# Patient Record
Sex: FEMALE | Race: WHITE | Employment: FULL TIME | URBAN - NONMETROPOLITAN AREA
[De-identification: names, ages, dates, MRNs, and addresses within clinical notes are randomized per-mention and may not be internally consistent; named-entity substitution may affect disease eponyms.]

---
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
## 2020-01-01 NOTE — ED PROVIDER NOTES
Triage Chief Complaint:   Abdominal Pain (states lower abdominal pain which began shortly after having intercourse. states they did try a new position. states if she presses on her abdomen the pain is worse. possibility she could be pregnant)    [place]:  Jace Lackey is a 23 y.o. female that presents with low abdominal pain. Started a short time PTA. Pain started after intercourse. No bleeding or spotting. She is unsure if she is pregnant or not. No flank pain. No urinary symptoms. No high fever. She has nausea but no vomiting. She rates her pain is an 8 on a scale 1-10. Nothing seems to make it better or worse. No history of acute or current medical problems. ROS:  General:  No fevers, no chills, no weakness  Eyes:  No recent vison changes, no discharge  ENT:  No sore throat, no nasal congestion, no hearing changes  Cardiovascular:  No chest pain, no palpitations  Respiratory:  No shortness of breath, no cough, no wheezing  Gastrointestinal: Low abdominal pain with nausea, no vomiting, no diarrhea  Musculoskeletal:  No muscle pain, no joint pain  Skin:  No rash, no pruritis, no easy bruising  Neurologic:  No speech problems, no headache, no extremity numbness, no extremity tingling, no extremity weakness  Psychiatric:  No anxiety  Genitourinary:  No dysuria, no hematuria  Endocrine:  No unexpected weight gain, no unexpected weight loss  Extremities:  no edema, no pain    Past Medical History:   Diagnosis Date    Ovarian cyst      Past Surgical History:   Procedure Laterality Date    TONSILLECTOMY       History reviewed. No pertinent family history.   Social History     Socioeconomic History    Marital status: Single     Spouse name: Not on file    Number of children: Not on file    Years of education: Not on file    Highest education level: Not on file   Occupational History    Not on file   Social Needs    Financial resource strain: Not on file    Food insecurity:     Worry: Not on file Inability: Not on file    Transportation needs:     Medical: Not on file     Non-medical: Not on file   Tobacco Use    Smoking status: Former Smoker    Smokeless tobacco: Never Used   Substance and Sexual Activity    Alcohol use: Yes     Comment: rare    Drug use: Never    Sexual activity: Yes     Partners: Male   Lifestyle    Physical activity:     Days per week: Not on file     Minutes per session: Not on file    Stress: Not on file   Relationships    Social connections:     Talks on phone: Not on file     Gets together: Not on file     Attends [employer] service: Not on file     Active member of club or organization: Not on file     Attends meetings of clubs or organizations: Not on file     Relationship status: Not on file    Intimate partner violence:     Fear of current or ex partner: Not on file     Emotionally abused: Not on file     Physically abused: Not on file     Forced sexual activity: Not on file   Other Topics Concern    Not on file   Social History Narrative    Not on file     No current facility-administered medications for this encounter. No current outpatient medications on file. Allergies   Allergen Reactions    Sulfa Antibiotics Rash       Nursing Notes Reviewed    Physical Exam:  ED Triage Vitals   Enc Vitals Group      BP 12/31/19 2045 125/85      Heart Rate 12/31/19 2045 85      Resp 12/31/19 2045 16      Temp 12/31/19 2045 98.4 °F (36.9 °C)      Temp Source 12/31/19 2045 Oral      SpO2 12/31/19 2045 100 %      Weight - Scale 12/31/19 2048 125 lb (56.7 kg)      Height 12/31/19 2048 5' 5\" (1.651 m)      Head Circumference --       Peak Flow --       Pain Score --       Pain Loc --       Pain Edu? --       Excl. in [address]? --      GENERAL APPEARANCE: Awake and alert. Cooperative. No acute distress. She is sitting up for examination. HEAD: Normocephalic. Atraumatic. EYES: EOM's grossly intact. Sclera anicteric. PEERL  ENT: Mucous membranes are moist. Tolerates saliva.  No

## 2020-01-01 NOTE — ED NOTES
Pt signed AMA papers and went over DC papers and to follow up with OB if pain persist.  Pt stated understanding and walked out of ER with friend.       Mira Hudson RN  12/31/19 2702